# Patient Record
Sex: FEMALE
[De-identification: names, ages, dates, MRNs, and addresses within clinical notes are randomized per-mention and may not be internally consistent; named-entity substitution may affect disease eponyms.]

---

## 2018-11-09 ENCOUNTER — HOSPITAL ENCOUNTER (OUTPATIENT)
Dept: HOSPITAL 92 - BICULT | Age: 68
Discharge: HOME | End: 2018-11-09
Attending: INTERNAL MEDICINE
Payer: MEDICARE

## 2018-11-09 DIAGNOSIS — N18.3: Primary | ICD-10-CM

## 2018-11-09 DIAGNOSIS — N27.1: ICD-10-CM

## 2018-11-09 PROCEDURE — 76770 US EXAM ABDO BACK WALL COMP: CPT

## 2018-11-09 NOTE — ULT
ULTRASOUND RENAL BILATERAL:

 

HISTORY: 

Chronic kidney disease.

 

COMPARISON: 

None.

 

FINDINGS: 

Right kidney measures 7.8 x 4.4 x 4.1 cm and the left kidney measures 7.9 x 4.8 x 24.2 cm.  There is 
a 2 cm right-sided renal cyst.  No renal mass or hydronephrosis.  The urinary bladder is unremarkable
.  

 

IMPRESSION:  

Small kidneys without evidence of obstruction.

 

POS: H

## 2020-06-10 ENCOUNTER — HOSPITAL ENCOUNTER (OUTPATIENT)
Dept: HOSPITAL 92 - BICRAD | Age: 70
Discharge: HOME | End: 2020-06-10
Attending: FAMILY MEDICINE
Payer: MEDICARE

## 2020-06-10 DIAGNOSIS — M19.032: ICD-10-CM

## 2020-06-10 DIAGNOSIS — S69.92XA: Primary | ICD-10-CM

## 2020-06-10 DIAGNOSIS — M19.042: ICD-10-CM

## 2020-06-10 NOTE — RAD
LEFT HAND 3 VIEWS:

 

HISTORY: 

Injury from a fall with swelling.

 

FINDINGS: 

Osteoarthrosis changes are noted involving the hand.  No acute fracture or dislocation.  

 

IMPRESSION: 

No fracture or dislocation.

 

POS: [facility]

## 2020-06-10 NOTE — RAD
EXAM:

LEFT WRIST THREE VIEWS:

6/10/20

 

HISTORY: 

Injury from a fall with swelling of hand and wrist. 

 

No acute fracture or dislocation. Arthrosis changes including the trapezium, first metacarpal joint.

 

IMPRESSION: 

Left wrist osteoarthrosis without acute fracture or dislocation. 

 

POS: RRE

## 2023-10-31 ENCOUNTER — HOSPITAL ENCOUNTER (OUTPATIENT)
Dept: HOSPITAL 92 - CSHMAMMO | Age: 73
Discharge: HOME | End: 2023-10-31
Attending: FAMILY MEDICINE
Payer: COMMERCIAL

## 2023-10-31 DIAGNOSIS — Z12.31: Primary | ICD-10-CM

## 2023-10-31 PROCEDURE — 77067 SCR MAMMO BI INCL CAD: CPT

## 2023-10-31 PROCEDURE — 77063 BREAST TOMOSYNTHESIS BI: CPT
